# Patient Record
Sex: FEMALE | Race: OTHER
[De-identification: names, ages, dates, MRNs, and addresses within clinical notes are randomized per-mention and may not be internally consistent; named-entity substitution may affect disease eponyms.]

---

## 2018-05-08 ENCOUNTER — HOSPITAL ENCOUNTER (EMERGENCY)
Dept: HOSPITAL 94 - ER | Age: 78
Discharge: HOME | End: 2018-05-08
Payer: MEDICARE

## 2018-05-08 VITALS — BODY MASS INDEX: 49.49 KG/M2 | HEIGHT: 62 IN | WEIGHT: 268.96 LBS

## 2018-05-08 VITALS — DIASTOLIC BLOOD PRESSURE: 61 MMHG | SYSTOLIC BLOOD PRESSURE: 143 MMHG

## 2018-05-08 DIAGNOSIS — E78.00: ICD-10-CM
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
## 2018-05-08 DIAGNOSIS — K21.9: ICD-10-CM

## 2018-05-08 DIAGNOSIS — Z95.1: ICD-10-CM

## 2018-05-08 DIAGNOSIS — S50.01XA: Primary | ICD-10-CM
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
## 2018-05-08 DIAGNOSIS — Z79.899: ICD-10-CM
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
## 2018-05-08 DIAGNOSIS — I10: ICD-10-CM

## 2018-05-08 DIAGNOSIS — Z90.49: ICD-10-CM

## 2018-05-08 DIAGNOSIS — F17.200: ICD-10-CM
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
## 2018-05-08 DIAGNOSIS — G89.29: ICD-10-CM

## 2018-05-08 DIAGNOSIS — Z79.82: ICD-10-CM

## 2018-05-08 PROCEDURE — 29105 APPLICATION LONG ARM SPLINT: CPT

## 2018-05-08 PROCEDURE — 99284 EMERGENCY DEPT VISIT MOD MDM: CPT

## 2018-05-08 PROCEDURE — 73080 X-RAY EXAM OF ELBOW: CPT

## 2019-09-03 ENCOUNTER — HOSPITAL ENCOUNTER (INPATIENT)
Dept: HOSPITAL 94 - ER | Age: 79
LOS: 3 days | Discharge: HOME HEALTH | DRG: 918 | End: 2019-09-06
Attending: FAMILY MEDICINE | Admitting: SPECIALIST
Payer: MEDICARE

## 2019-09-03 VITALS — HEIGHT: 62 IN | WEIGHT: 233.69 LBS | BODY MASS INDEX: 43 KG/M2

## 2019-09-03 DIAGNOSIS — G89.29: ICD-10-CM
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
## 2019-09-03 DIAGNOSIS — G25.81: ICD-10-CM

## 2019-09-03 DIAGNOSIS — Z95.1: ICD-10-CM

## 2019-09-03 DIAGNOSIS — M54.9: ICD-10-CM

## 2019-09-03 DIAGNOSIS — K74.60: ICD-10-CM
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
## 2019-09-03 DIAGNOSIS — D64.9: ICD-10-CM

## 2019-09-03 DIAGNOSIS — N17.9: ICD-10-CM

## 2019-09-03 DIAGNOSIS — K21.9: ICD-10-CM
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
## 2019-09-03 DIAGNOSIS — B96.20: ICD-10-CM

## 2019-09-03 DIAGNOSIS — E72.20: ICD-10-CM

## 2019-09-03 DIAGNOSIS — N39.0: ICD-10-CM

## 2019-09-03 DIAGNOSIS — F32.9: ICD-10-CM

## 2019-09-03 DIAGNOSIS — I12.9: ICD-10-CM

## 2019-09-03 DIAGNOSIS — I25.10: ICD-10-CM

## 2019-09-03 DIAGNOSIS — Z90.49: ICD-10-CM

## 2019-09-03 DIAGNOSIS — N18.9: ICD-10-CM

## 2019-09-03 DIAGNOSIS — K29.70: ICD-10-CM

## 2019-09-03 DIAGNOSIS — I44.2: ICD-10-CM
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
## 2019-09-03 DIAGNOSIS — E86.0: ICD-10-CM

## 2019-09-03 DIAGNOSIS — Z79.82: ICD-10-CM

## 2019-09-03 DIAGNOSIS — E78.00: ICD-10-CM

## 2019-09-03 DIAGNOSIS — K75.81: ICD-10-CM

## 2019-09-03 DIAGNOSIS — K52.9: ICD-10-CM

## 2019-09-03 DIAGNOSIS — T42.6X3A: Primary | ICD-10-CM

## 2019-09-03 DIAGNOSIS — E78.5: ICD-10-CM

## 2019-09-03 LAB
ALBUMIN SERPL BCP-MCNC: 3.5 G/DL (ref 3.4–5)
ALBUMIN/GLOB SERPL: 0.8 {RATIO} (ref 1.1–1.5)
ALP SERPL-CCNC: 83 IU/L (ref 46–116)
ALT SERPL W P-5'-P-CCNC: 20 U/L (ref 12–78)
ANION GAP SERPL CALCULATED.3IONS-SCNC: 10 MMOL/L (ref 8–16)
AST SERPL W P-5'-P-CCNC: 23 U/L (ref 10–37)
BACTERIA URNS QL MICRO: (no result) /HPF
BASOPHILS # BLD AUTO: 0 X10'3 (ref 0–0.2)
BASOPHILS NFR BLD AUTO: 0.4 % (ref 0–1)
BILIRUB SERPL-MCNC: 0.5 MG/DL (ref 0.1–1)
BUN SERPL-MCNC: 26 MG/DL (ref 7–18)
BUN/CREAT SERPL: 11.7 (ref 6.6–38)
CALCIUM SERPL-MCNC: 9 MG/DL (ref 8.5–10.1)
CHLORIDE SERPL-SCNC: 104 MMOL/L (ref 99–107)
CLARITY UR: CLEAR
CO2 SERPL-SCNC: 25.3 MMOL/L (ref 24–32)
COLOR UR: YELLOW
CREAT SERPL-MCNC: 2.23 MG/DL (ref 0.4–0.9)
DEPRECATED SQUAMOUS URNS QL MICRO: (no result) /LPF
EOSINOPHIL # BLD AUTO: 0.1 X10'3 (ref 0–0.9)
EOSINOPHIL NFR BLD AUTO: 1.2 % (ref 0–6)
ERYTHROCYTE [DISTWIDTH] IN BLOOD BY AUTOMATED COUNT: 12.7 % (ref 11.5–14.5)
GFR SERPL CREATININE-BSD FRML MDRD: 21 ML/MIN
GLUCOSE SERPL-MCNC: 107 MG/DL (ref 70–104)
GLUCOSE UR STRIP-MCNC: NEGATIVE MG/DL
HCT VFR BLD AUTO: 36 % (ref 35–45)
HGB BLD-MCNC: 12.1 G/DL (ref 12–16)
HGB UR QL STRIP: NEGATIVE
KETONES UR STRIP-MCNC: NEGATIVE MG/DL
LEUKOCYTE ESTERASE UR QL STRIP: (no result)
LIPASE SERPL-CCNC: 243 U/L (ref 73–393)
LYMPHOCYTES # BLD AUTO: 1.8 X10'3 (ref 1.1–4.8)
LYMPHOCYTES NFR BLD AUTO: 18.5 % (ref 21–51)
MCH RBC QN AUTO: 30.9 PG (ref 27–31)
MCHC RBC AUTO-ENTMCNC: 33.5 G/DL (ref 33–36.5)
MCV RBC AUTO: 92.4 FL (ref 78–98)
MONOCYTES # BLD AUTO: 0.6 X10'3 (ref 0–0.9)
MONOCYTES NFR BLD AUTO: 6.2 % (ref 2–12)
MUCOUS THREADS URNS QL MICRO: (no result) /LPF
NEUTROPHILS # BLD AUTO: 7.2 X10'3 (ref 1.8–7.7)
NEUTROPHILS NFR BLD AUTO: 73.7 % (ref 42–75)
NITRITE UR QL STRIP: NEGATIVE
PH UR STRIP: 7 [PH] (ref 4.8–8)
PLATELET # BLD AUTO: 212 X10'3 (ref 140–440)
PMV BLD AUTO: 8.5 FL (ref 7.4–10.4)
POTASSIUM SERPL-SCNC: 4.7 MMOL/L (ref 3.5–5.1)
PROT SERPL-MCNC: 7.9 G/DL (ref 6.4–8.2)
PROT UR QL STRIP: NEGATIVE MG/DL
RBC # BLD AUTO: 3.9 X10'6 (ref 4.2–5.6)
RBC #/AREA URNS HPF: (no result) /HPF (ref 0–2)
SODIUM SERPL-SCNC: 139 MMOL/L (ref 135–145)
SP GR UR STRIP: <=1.005 (ref 1–1.03)
TROPONIN I SERPL-MCNC: < 0.04 NG/ML (ref 0–0.05)
URN COLLECT METHOD CLASS: (no result)
UROBILINOGEN UR STRIP-MCNC: 0.2 E.U/DL (ref 0.2–1)
WBC # BLD AUTO: 9.8 X10'3 (ref 4.5–11)
WBC #/AREA URNS HPF: (no result) /HPF (ref 0–4)

## 2019-09-03 PROCEDURE — 85025 COMPLETE CBC W/AUTO DIFF WBC: CPT

## 2019-09-03 PROCEDURE — 81001 URINALYSIS AUTO W/SCOPE: CPT

## 2019-09-03 PROCEDURE — 97162 PT EVAL MOD COMPLEX 30 MIN: CPT

## 2019-09-03 PROCEDURE — 70544 MR ANGIOGRAPHY HEAD W/O DYE: CPT

## 2019-09-03 PROCEDURE — 85610 PROTHROMBIN TIME: CPT

## 2019-09-03 PROCEDURE — 70551 MRI BRAIN STEM W/O DYE: CPT

## 2019-09-03 PROCEDURE — 93880 EXTRACRANIAL BILAT STUDY: CPT

## 2019-09-03 PROCEDURE — 96374 THER/PROPH/DIAG INJ IV PUSH: CPT

## 2019-09-03 PROCEDURE — 82103 ALPHA-1-ANTITRYPSIN TOTAL: CPT

## 2019-09-03 PROCEDURE — 86705 HEP B CORE ANTIBODY IGM: CPT

## 2019-09-03 PROCEDURE — 83735 ASSAY OF MAGNESIUM: CPT

## 2019-09-03 PROCEDURE — 82140 ASSAY OF AMMONIA: CPT

## 2019-09-03 PROCEDURE — 80053 COMPREHEN METABOLIC PANEL: CPT

## 2019-09-03 PROCEDURE — 97530 THERAPEUTIC ACTIVITIES: CPT

## 2019-09-03 PROCEDURE — 86706 HEP B SURFACE ANTIBODY: CPT

## 2019-09-03 PROCEDURE — 97116 GAIT TRAINING THERAPY: CPT

## 2019-09-03 PROCEDURE — 84100 ASSAY OF PHOSPHORUS: CPT

## 2019-09-03 PROCEDURE — 36415 COLL VENOUS BLD VENIPUNCTURE: CPT

## 2019-09-03 PROCEDURE — 74176 CT ABD & PELVIS W/O CONTRAST: CPT

## 2019-09-03 PROCEDURE — 87340 HEPATITIS B SURFACE AG IA: CPT

## 2019-09-03 PROCEDURE — 84484 ASSAY OF TROPONIN QUANT: CPT

## 2019-09-03 PROCEDURE — 87186 SC STD MICRODIL/AGAR DIL: CPT

## 2019-09-03 PROCEDURE — 83690 ASSAY OF LIPASE: CPT

## 2019-09-03 PROCEDURE — 86803 HEPATITIS C AB TEST: CPT

## 2019-09-03 PROCEDURE — 87088 URINE BACTERIA CULTURE: CPT

## 2019-09-03 PROCEDURE — 87081 CULTURE SCREEN ONLY: CPT

## 2019-09-03 PROCEDURE — 99285 EMERGENCY DEPT VISIT HI MDM: CPT

## 2019-09-03 PROCEDURE — 87077 CULTURE AEROBIC IDENTIFY: CPT

## 2019-09-03 PROCEDURE — 93306 TTE W/DOPPLER COMPLETE: CPT

## 2019-09-04 VITALS — SYSTOLIC BLOOD PRESSURE: 141 MMHG | DIASTOLIC BLOOD PRESSURE: 60 MMHG

## 2019-09-04 VITALS — SYSTOLIC BLOOD PRESSURE: 156 MMHG | DIASTOLIC BLOOD PRESSURE: 57 MMHG

## 2019-09-04 VITALS — DIASTOLIC BLOOD PRESSURE: 88 MMHG | SYSTOLIC BLOOD PRESSURE: 139 MMHG

## 2019-09-04 VITALS — SYSTOLIC BLOOD PRESSURE: 142 MMHG | DIASTOLIC BLOOD PRESSURE: 55 MMHG

## 2019-09-04 VITALS — SYSTOLIC BLOOD PRESSURE: 142 MMHG | DIASTOLIC BLOOD PRESSURE: 50 MMHG

## 2019-09-04 VITALS — DIASTOLIC BLOOD PRESSURE: 64 MMHG | SYSTOLIC BLOOD PRESSURE: 169 MMHG

## 2019-09-04 VITALS — SYSTOLIC BLOOD PRESSURE: 151 MMHG | DIASTOLIC BLOOD PRESSURE: 58 MMHG

## 2019-09-04 LAB
ALBUMIN SERPL BCP-MCNC: 3 G/DL (ref 3.4–5)
ALBUMIN/GLOB SERPL: 0.8 {RATIO} (ref 1.1–1.5)
ALP SERPL-CCNC: 69 IU/L (ref 46–116)
ALT SERPL W P-5'-P-CCNC: 17 U/L (ref 12–78)
ANION GAP SERPL CALCULATED.3IONS-SCNC: 6 MMOL/L (ref 8–16)
AST SERPL W P-5'-P-CCNC: 21 U/L (ref 10–37)
BASOPHILS # BLD AUTO: 0 X10'3 (ref 0–0.2)
BASOPHILS NFR BLD AUTO: 0.3 % (ref 0–1)
BILIRUB SERPL-MCNC: 0.4 MG/DL (ref 0.1–1)
BUN SERPL-MCNC: 23 MG/DL (ref 7–18)
BUN/CREAT SERPL: 10.6 (ref 6.6–38)
CALCIUM SERPL-MCNC: 8.5 MG/DL (ref 8.5–10.1)
CHLORIDE SERPL-SCNC: 109 MMOL/L (ref 99–107)
CO2 SERPL-SCNC: 27.8 MMOL/L (ref 24–32)
CREAT SERPL-MCNC: 2.18 MG/DL (ref 0.4–0.9)
EOSINOPHIL # BLD AUTO: 0.1 X10'3 (ref 0–0.9)
EOSINOPHIL NFR BLD AUTO: 1 % (ref 0–6)
ERYTHROCYTE [DISTWIDTH] IN BLOOD BY AUTOMATED COUNT: 12.9 % (ref 11.5–14.5)
GFR SERPL CREATININE-BSD FRML MDRD: 22 ML/MIN
GLUCOSE SERPL-MCNC: 99 MG/DL (ref 70–104)
HCT VFR BLD AUTO: 30.5 % (ref 35–45)
HGB BLD-MCNC: 10.3 G/DL (ref 12–16)
LYMPHOCYTES # BLD AUTO: 1.9 X10'3 (ref 1.1–4.8)
LYMPHOCYTES NFR BLD AUTO: 23.8 % (ref 21–51)
MCH RBC QN AUTO: 31.2 PG (ref 27–31)
MCHC RBC AUTO-ENTMCNC: 33.8 G/DL (ref 33–36.5)
MCV RBC AUTO: 92.3 FL (ref 78–98)
MONOCYTES # BLD AUTO: 0.6 X10'3 (ref 0–0.9)
MONOCYTES NFR BLD AUTO: 7.7 % (ref 2–12)
NEUTROPHILS # BLD AUTO: 5.4 X10'3 (ref 1.8–7.7)
NEUTROPHILS NFR BLD AUTO: 67.2 % (ref 42–75)
PLATELET # BLD AUTO: 161 X10'3 (ref 140–440)
PMV BLD AUTO: 8.4 FL (ref 7.4–10.4)
POTASSIUM SERPL-SCNC: 4.5 MMOL/L (ref 3.5–5.1)
PROT SERPL-MCNC: 6.8 G/DL (ref 6.4–8.2)
RBC # BLD AUTO: 3.3 X10'6 (ref 4.2–5.6)
SODIUM SERPL-SCNC: 143 MMOL/L (ref 135–145)
WBC # BLD AUTO: 8 X10'3 (ref 4.5–11)

## 2019-09-04 RX ADMIN — HYDROCODONE BITARTRATE AND ACETAMINOPHEN PRN TAB: 5; 325 TABLET ORAL at 08:17

## 2019-09-04 RX ADMIN — HYDROCODONE BITARTRATE AND ACETAMINOPHEN PRN TAB: 5; 325 TABLET ORAL at 15:46

## 2019-09-04 RX ADMIN — PANTOPRAZOLE SODIUM SCH MG: 40 TABLET, DELAYED RELEASE ORAL at 08:08

## 2019-09-04 RX ADMIN — HEPARIN SODIUM SCH UNIT: 5000 INJECTION, SOLUTION INTRAVENOUS; SUBCUTANEOUS at 20:52

## 2019-09-04 RX ADMIN — CEFTRIAXONE SCH MLS/HR: 1 INJECTION, SOLUTION INTRAVENOUS at 20:58

## 2019-09-04 RX ADMIN — HYDROCODONE BITARTRATE AND ACETAMINOPHEN PRN TAB: 5; 325 TABLET ORAL at 01:09

## 2019-09-04 RX ADMIN — SODIUM CHLORIDE SCH MLS/HR: 9 INJECTION INTRAMUSCULAR; INTRAVENOUS; SUBCUTANEOUS at 19:14

## 2019-09-04 RX ADMIN — HYDROCODONE BITARTRATE AND ACETAMINOPHEN PRN TAB: 10; 325 TABLET ORAL at 21:07

## 2019-09-04 RX ADMIN — HEPARIN SODIUM SCH UNIT: 5000 INJECTION, SOLUTION INTRAVENOUS; SUBCUTANEOUS at 08:10

## 2019-09-04 RX ADMIN — SODIUM CHLORIDE SCH MLS/HR: 9 INJECTION INTRAMUSCULAR; INTRAVENOUS; SUBCUTANEOUS at 09:52

## 2019-09-04 NOTE — NUR
Problems reprioritized. Patient report given, questions answered & plan of care reviewed 
with CORINE Muhammad.  Patient currently resting in bed, bed locked and low, call light in reach.  
Stable at shift change.

## 2019-09-04 NOTE — NUR
Student documentation:

I have reviewed and agree with all interventions, assessments performed and documented by 
Mandy.

## 2019-09-04 NOTE — NUR
Patient in room PCU 3017. I have received report from Jhonny Irizarry  and had the 
opportunity to ask questions and assume patient care.  Patient currently resting in bed, 
visitor at bedside, bed locked and low, call light in reach.  cardizem at 5ml/hr, HR went up 
to 150s but did not sustain, currently in 100-110s, will continue to monitor.

-------------------------------------------------------------------------------

Addendum: 09/04/19 at 0635 by Kyleigh Nolan RN

-------------------------------------------------------------------------------

charted on wrong patient

## 2019-09-04 NOTE — NUR
Problems reprioritized. Patient report given, questions answered & plan of care reviewed 
with Kyleigh POOL.

## 2019-09-04 NOTE — NUR
report given to René POOL. Pt laying comfortably in bed, call light within reach, side rails 
up x2, bed in lowest position, and bed alarm on.

## 2019-09-04 NOTE — NUR
Student documentation:

I have reviewed and agree with all interventions, assessments performed and documented by 
Mandy Minor.

## 2019-09-04 NOTE — NUR
Patient in room PCU 3017. I have received report from CORINE Weaver  and had the opportunity 
to ask questions and assume patient care.  Patient is currently sitting up on edge of bed, 
son in room, bed locked and low, call light in reach.  Patient reports needing restroom, 
will get BSC as pt had syncopal episodes at home.  patient is primarily Bulgarian speaking, 
will use translation services as necessary.  Will continue to monitor.

## 2019-09-04 NOTE — NUR
PAGER ID:  [phone] 

MESSAGE:  CORINE Arizmendi, [id number], [place], patient c/o of leg pain, asking about the "new medicine" 
the doctor told her about last night. Notes show it was requip to replace the gabapentin she 
was on at home.

## 2019-09-04 NOTE — NUR
Received report from Daniel in the ER. Pt arrived on the unit via gurney. She was able to 
transfer from the gurney to her bed with moderate assist. Pt wobbly on her feet with mild 
weakness. Her son has accompanied her and is staying at bedside.  Pt placed on tele #50, 
VSS, no signs of distress, will continue to monitor.

## 2019-09-05 VITALS — DIASTOLIC BLOOD PRESSURE: 67 MMHG | SYSTOLIC BLOOD PRESSURE: 167 MMHG

## 2019-09-05 VITALS — SYSTOLIC BLOOD PRESSURE: 181 MMHG | DIASTOLIC BLOOD PRESSURE: 61 MMHG

## 2019-09-05 VITALS — DIASTOLIC BLOOD PRESSURE: 74 MMHG | SYSTOLIC BLOOD PRESSURE: 213 MMHG

## 2019-09-05 VITALS — SYSTOLIC BLOOD PRESSURE: 149 MMHG | DIASTOLIC BLOOD PRESSURE: 55 MMHG

## 2019-09-05 VITALS — DIASTOLIC BLOOD PRESSURE: 43 MMHG | SYSTOLIC BLOOD PRESSURE: 131 MMHG

## 2019-09-05 VITALS — DIASTOLIC BLOOD PRESSURE: 48 MMHG | SYSTOLIC BLOOD PRESSURE: 156 MMHG

## 2019-09-05 VITALS — SYSTOLIC BLOOD PRESSURE: 200 MMHG | DIASTOLIC BLOOD PRESSURE: 45 MMHG

## 2019-09-05 VITALS — SYSTOLIC BLOOD PRESSURE: 135 MMHG | DIASTOLIC BLOOD PRESSURE: 48 MMHG

## 2019-09-05 VITALS — DIASTOLIC BLOOD PRESSURE: 55 MMHG | SYSTOLIC BLOOD PRESSURE: 155 MMHG

## 2019-09-05 VITALS — SYSTOLIC BLOOD PRESSURE: 147 MMHG | DIASTOLIC BLOOD PRESSURE: 54 MMHG

## 2019-09-05 LAB
ALBUMIN SERPL BCP-MCNC: 2.9 G/DL (ref 3.4–5)
ALBUMIN/GLOB SERPL: 0.8 {RATIO} (ref 1.1–1.5)
ALP SERPL-CCNC: 66 IU/L (ref 46–116)
ALT SERPL W P-5'-P-CCNC: 14 U/L (ref 12–78)
ANION GAP SERPL CALCULATED.3IONS-SCNC: 8 MMOL/L (ref 8–16)
AST SERPL W P-5'-P-CCNC: 16 U/L (ref 10–37)
BASOPHILS # BLD AUTO: 0 X10'3 (ref 0–0.2)
BASOPHILS NFR BLD AUTO: 0.3 % (ref 0–1)
BILIRUB SERPL-MCNC: 0.3 MG/DL (ref 0.1–1)
BUN SERPL-MCNC: 19 MG/DL (ref 7–18)
BUN/CREAT SERPL: 9.2 (ref 6.6–38)
CALCIUM SERPL-MCNC: 8.9 MG/DL (ref 8.5–10.1)
CHLORIDE SERPL-SCNC: 111 MMOL/L (ref 99–107)
CO2 SERPL-SCNC: 24.2 MMOL/L (ref 24–32)
CREAT SERPL-MCNC: 2.06 MG/DL (ref 0.4–0.9)
EOSINOPHIL # BLD AUTO: 0.1 X10'3 (ref 0–0.9)
EOSINOPHIL NFR BLD AUTO: 1.5 % (ref 0–6)
ERYTHROCYTE [DISTWIDTH] IN BLOOD BY AUTOMATED COUNT: 12.8 % (ref 11.5–14.5)
GFR SERPL CREATININE-BSD FRML MDRD: 23 ML/MIN
GLUCOSE SERPL-MCNC: 95 MG/DL (ref 70–104)
HCT VFR BLD AUTO: 29.9 % (ref 35–45)
HGB BLD-MCNC: 10.2 G/DL (ref 12–16)
LYMPHOCYTES # BLD AUTO: 1.9 X10'3 (ref 1.1–4.8)
LYMPHOCYTES NFR BLD AUTO: 29.6 % (ref 21–51)
MAGNESIUM SERPL-MCNC: 1.9 MG/DL (ref 1.5–2.4)
MCH RBC QN AUTO: 31.4 PG (ref 27–31)
MCHC RBC AUTO-ENTMCNC: 34.2 G/DL (ref 33–36.5)
MCV RBC AUTO: 91.8 FL (ref 78–98)
MONOCYTES # BLD AUTO: 0.5 X10'3 (ref 0–0.9)
MONOCYTES NFR BLD AUTO: 8.2 % (ref 2–12)
NEUTROPHILS # BLD AUTO: 4 X10'3 (ref 1.8–7.7)
NEUTROPHILS NFR BLD AUTO: 60.4 % (ref 42–75)
PHOSPHATE SERPL-MCNC: 3.5 MG/DL (ref 2.3–4.5)
PLATELET # BLD AUTO: 154 X10'3 (ref 140–440)
PMV BLD AUTO: 8.2 FL (ref 7.4–10.4)
POTASSIUM SERPL-SCNC: 4.4 MMOL/L (ref 3.5–5.1)
PROT SERPL-MCNC: 6.5 G/DL (ref 6.4–8.2)
RBC # BLD AUTO: 3.26 X10'6 (ref 4.2–5.6)
SODIUM SERPL-SCNC: 143 MMOL/L (ref 135–145)
WBC # BLD AUTO: 6.6 X10'3 (ref 4.5–11)

## 2019-09-05 RX ADMIN — SODIUM CHLORIDE SCH MLS/HR: 9 INJECTION INTRAMUSCULAR; INTRAVENOUS; SUBCUTANEOUS at 21:20

## 2019-09-05 RX ADMIN — CEFTRIAXONE SCH MLS/HR: 1 INJECTION, SOLUTION INTRAVENOUS at 07:41

## 2019-09-05 RX ADMIN — HYDROCODONE BITARTRATE AND ACETAMINOPHEN PRN TAB: 10; 325 TABLET ORAL at 08:14

## 2019-09-05 RX ADMIN — SODIUM CHLORIDE SCH GM: 0.9 IRRIGANT IRRIGATION at 15:11

## 2019-09-05 RX ADMIN — SODIUM CHLORIDE SCH GM: 0.9 IRRIGANT IRRIGATION at 19:22

## 2019-09-05 RX ADMIN — HEPARIN SODIUM SCH UNIT: 5000 INJECTION, SOLUTION INTRAVENOUS; SUBCUTANEOUS at 07:43

## 2019-09-05 RX ADMIN — SODIUM CHLORIDE SCH MLS/HR: 9 INJECTION INTRAMUSCULAR; INTRAVENOUS; SUBCUTANEOUS at 06:00

## 2019-09-05 RX ADMIN — HYDROCODONE BITARTRATE AND ACETAMINOPHEN PRN TAB: 5; 325 TABLET ORAL at 23:06

## 2019-09-05 RX ADMIN — HEPARIN SODIUM SCH UNIT: 5000 INJECTION, SOLUTION INTRAVENOUS; SUBCUTANEOUS at 19:24

## 2019-09-05 RX ADMIN — PANTOPRAZOLE SODIUM SCH MG: 40 TABLET, DELAYED RELEASE ORAL at 07:41

## 2019-09-05 NOTE — NUR
Patient in room PCU 3017. I have received report from CORINE Engel and had the opportunity to 
ask questions and assume patient care.

## 2019-09-05 NOTE — NUR
Problems reprioritized. Patient report given, questions answered & plan of care reviewed 
with Jamila POOL. Patient stable at transfer of care.

## 2019-09-05 NOTE — NUR
Patient in room PCU 3017. I have received report from Jb POOL  and had the opportunity to 
ask questions and assume patient care.  Patient asleep.  NS @ 100 mL/hour infusing.  All 
immediate needs met at this time.

## 2019-09-05 NOTE — NUR
PT SITTING ON TOILET. VOMITING. 9.4 SEC PAUSE. PT RIGHT BACK TO SR. DR SANTIAGO INFORMED. PT 
BACK TO BED.  V/S WNL. WILL CONTINUE TO MONITOR.

-------------------------------------------------------------------------------

Addendum: 09/06/19 at 0801 by Sindi Seivlla RN

-------------------------------------------------------------------------------

Amended: Links added.

## 2019-09-06 VITALS — SYSTOLIC BLOOD PRESSURE: 111 MMHG | DIASTOLIC BLOOD PRESSURE: 51 MMHG

## 2019-09-06 VITALS — DIASTOLIC BLOOD PRESSURE: 56 MMHG | SYSTOLIC BLOOD PRESSURE: 164 MMHG

## 2019-09-06 VITALS — DIASTOLIC BLOOD PRESSURE: 51 MMHG | SYSTOLIC BLOOD PRESSURE: 99 MMHG

## 2019-09-06 LAB
AFP SERPL-MCNC: 2 NG/ML (ref 0–8.3)
ALBUMIN SERPL BCP-MCNC: 3.2 G/DL (ref 3.4–5)
ALBUMIN/GLOB SERPL: 0.8 {RATIO} (ref 1.1–1.5)
ALP SERPL-CCNC: 73 IU/L (ref 46–116)
ALT SERPL W P-5'-P-CCNC: 17 U/L (ref 12–78)
ANION GAP SERPL CALCULATED.3IONS-SCNC: 9 MMOL/L (ref 8–16)
AST SERPL W P-5'-P-CCNC: 21 U/L (ref 10–37)
BASOPHILS # BLD AUTO: 0 X10'3 (ref 0–0.2)
BASOPHILS NFR BLD AUTO: 0.4 % (ref 0–1)
BILIRUB SERPL-MCNC: 0.5 MG/DL (ref 0.1–1)
BUN SERPL-MCNC: 16 MG/DL (ref 7–18)
BUN/CREAT SERPL: 7.1 (ref 6.6–38)
CALCIUM SERPL-MCNC: 8.6 MG/DL (ref 8.5–10.1)
CHLORIDE SERPL-SCNC: 110 MMOL/L (ref 99–107)
CO2 SERPL-SCNC: 25.2 MMOL/L (ref 24–32)
CREAT SERPL-MCNC: 2.25 MG/DL (ref 0.4–0.9)
EOSINOPHIL # BLD AUTO: 0.1 X10'3 (ref 0–0.9)
EOSINOPHIL NFR BLD AUTO: 1.9 % (ref 0–6)
ERYTHROCYTE [DISTWIDTH] IN BLOOD BY AUTOMATED COUNT: 12.5 % (ref 11.5–14.5)
GFR SERPL CREATININE-BSD FRML MDRD: 21 ML/MIN
GLUCOSE SERPL-MCNC: 100 MG/DL (ref 70–104)
HCT VFR BLD AUTO: 33.5 % (ref 35–45)
HEPATITIS C ANTIBODY: <0.1 S/CO RATIO (ref 0–0.9)
HGB BLD-MCNC: 11.2 G/DL (ref 12–16)
LYMPHOCYTES # BLD AUTO: 1.5 X10'3 (ref 1.1–4.8)
LYMPHOCYTES NFR BLD AUTO: 24.3 % (ref 21–51)
MAGNESIUM SERPL-MCNC: 1.8 MG/DL (ref 1.5–2.4)
MCH RBC QN AUTO: 31.1 PG (ref 27–31)
MCHC RBC AUTO-ENTMCNC: 33.4 G/DL (ref 33–36.5)
MCV RBC AUTO: 93 FL (ref 78–98)
MONOCYTES # BLD AUTO: 0.6 X10'3 (ref 0–0.9)
MONOCYTES NFR BLD AUTO: 9 % (ref 2–12)
NEUTROPHILS # BLD AUTO: 4.1 X10'3 (ref 1.8–7.7)
NEUTROPHILS NFR BLD AUTO: 64.4 % (ref 42–75)
PHOSPHATE SERPL-MCNC: 3.3 MG/DL (ref 2.3–4.5)
PLATELET # BLD AUTO: 158 X10'3 (ref 140–440)
PMV BLD AUTO: 8.6 FL (ref 7.4–10.4)
POTASSIUM SERPL-SCNC: 4.2 MMOL/L (ref 3.5–5.1)
PROT SERPL-MCNC: 7 G/DL (ref 6.4–8.2)
RBC # BLD AUTO: 3.6 X10'6 (ref 4.2–5.6)
SODIUM SERPL-SCNC: 144 MMOL/L (ref 135–145)
WBC # BLD AUTO: 6.3 X10'3 (ref 4.5–11)

## 2019-09-06 RX ADMIN — PANTOPRAZOLE SODIUM SCH MG: 40 TABLET, DELAYED RELEASE ORAL at 08:04

## 2019-09-06 RX ADMIN — CEFTRIAXONE SCH MLS/HR: 1 INJECTION, SOLUTION INTRAVENOUS at 08:06

## 2019-09-06 RX ADMIN — HEPARIN SODIUM SCH UNIT: 5000 INJECTION, SOLUTION INTRAVENOUS; SUBCUTANEOUS at 08:07

## 2019-09-06 RX ADMIN — HYDROCODONE BITARTRATE AND ACETAMINOPHEN PRN TAB: 10; 325 TABLET ORAL at 04:09

## 2019-09-06 RX ADMIN — SODIUM CHLORIDE SCH GM: 0.9 IRRIGANT IRRIGATION at 08:06

## 2019-09-06 RX ADMIN — SODIUM CHLORIDE SCH GM: 0.9 IRRIGANT IRRIGATION at 04:08

## 2019-09-06 RX ADMIN — SODIUM CHLORIDE SCH GM: 0.9 IRRIGANT IRRIGATION at 00:05

## 2019-09-06 NOTE — NUR
Patient discharged stable to home. All personal belongings returned to patient. Discharge 
instructions given verbally and written. Patient verbalized understanding. Prescriptions 
filled by [facility] at bedside. Peripheral IV removed, cannula intact. Tele box 
removed and returned to tele tech room. Patient wheeled out by staff via wheelchair to cab 
accompanied by son.

## 2019-09-06 NOTE — NUR
Problems reprioritized. Patient report given, questions answered & plan of care reviewed 
with CORINE España.

## 2019-09-06 NOTE — NUR
Patient in room PCU 3017. I have received report from  Jamila POOL  and had the opportunity to 
ask questions and assume patient care.

## 2019-09-07 ENCOUNTER — HOSPITAL ENCOUNTER (EMERGENCY)
Dept: HOSPITAL 94 - ER | Age: 79
LOS: 1 days | Discharge: HOME | End: 2019-09-08
Payer: MEDICARE

## 2019-09-07 VITALS — BODY MASS INDEX: 41.02 KG/M2 | HEIGHT: 63 IN | WEIGHT: 231.49 LBS

## 2019-09-07 DIAGNOSIS — R19.7: ICD-10-CM

## 2019-09-07 DIAGNOSIS — G89.29: ICD-10-CM

## 2019-09-07 DIAGNOSIS — K21.9: ICD-10-CM

## 2019-09-07 DIAGNOSIS — R11.2: ICD-10-CM

## 2019-09-07 DIAGNOSIS — Z98.61: ICD-10-CM

## 2019-09-07 DIAGNOSIS — Z79.899: ICD-10-CM

## 2019-09-07 DIAGNOSIS — Z98.890: ICD-10-CM

## 2019-09-07 DIAGNOSIS — Z90.49: ICD-10-CM

## 2019-09-07 DIAGNOSIS — F32.9: ICD-10-CM

## 2019-09-07 DIAGNOSIS — I12.9: Primary | ICD-10-CM

## 2019-09-07 DIAGNOSIS — Z95.1: ICD-10-CM

## 2019-09-07 DIAGNOSIS — Z79.82: ICD-10-CM

## 2019-09-07 DIAGNOSIS — E78.00: ICD-10-CM

## 2019-09-07 LAB
ALBUMIN SERPL BCP-MCNC: 3.8 G/DL (ref 3.4–5)
ALBUMIN/GLOB SERPL: 0.9 {RATIO} (ref 1.1–1.5)
ALP SERPL-CCNC: 84 IU/L (ref 46–116)
ALT SERPL W P-5'-P-CCNC: 21 U/L (ref 12–78)
ANION GAP SERPL CALCULATED.3IONS-SCNC: 12 MMOL/L (ref 8–16)
APTT PPP: 29 SECONDS (ref 22–32)
AST SERPL W P-5'-P-CCNC: 29 U/L (ref 10–37)
BASOPHILS # BLD AUTO: 0 X10'3 (ref 0–0.2)
BASOPHILS NFR BLD AUTO: 0.4 % (ref 0–1)
BILIRUB SERPL-MCNC: 0.8 MG/DL (ref 0.1–1)
BUN SERPL-MCNC: 18 MG/DL (ref 7–18)
BUN/CREAT SERPL: 7.8 (ref 6.6–38)
CALCIUM SERPL-MCNC: 9.5 MG/DL (ref 8.5–10.1)
CHLORIDE SERPL-SCNC: 104 MMOL/L (ref 99–107)
CLARITY UR: (no result)
CO2 SERPL-SCNC: 24.1 MMOL/L (ref 24–32)
COLOR UR: YELLOW
CREAT SERPL-MCNC: 2.3 MG/DL (ref 0.4–0.9)
EOSINOPHIL # BLD AUTO: 0 X10'3 (ref 0–0.9)
EOSINOPHIL NFR BLD AUTO: 0.5 % (ref 0–6)
ERYTHROCYTE [DISTWIDTH] IN BLOOD BY AUTOMATED COUNT: 12.7 % (ref 11.5–14.5)
GFR SERPL CREATININE-BSD FRML MDRD: 21 ML/MIN
GLUCOSE SERPL-MCNC: 118 MG/DL (ref 70–104)
GLUCOSE UR STRIP-MCNC: NEGATIVE MG/DL
HCT VFR BLD AUTO: 36.1 % (ref 35–45)
HGB BLD-MCNC: 12.4 G/DL (ref 12–16)
HGB UR QL STRIP: (no result)
KETONES UR STRIP-MCNC: 15 MG/DL
LEUKOCYTE ESTERASE UR QL STRIP: NEGATIVE
LYMPHOCYTES # BLD AUTO: 1.9 X10'3 (ref 1.1–4.8)
LYMPHOCYTES NFR BLD AUTO: 18.2 % (ref 21–51)
MCH RBC QN AUTO: 31.1 PG (ref 27–31)
MCHC RBC AUTO-ENTMCNC: 34.5 G/DL (ref 33–36.5)
MCV RBC AUTO: 90.3 FL (ref 78–98)
MONOCYTES # BLD AUTO: 0.7 X10'3 (ref 0–0.9)
MONOCYTES NFR BLD AUTO: 6.7 % (ref 2–12)
NEUTROPHILS # BLD AUTO: 7.9 X10'3 (ref 1.8–7.7)
NEUTROPHILS NFR BLD AUTO: 74.2 % (ref 42–75)
NITRITE UR QL STRIP: NEGATIVE
PH UR STRIP: 6 [PH] (ref 4.8–8)
PLATELET # BLD AUTO: 179 X10'3 (ref 140–440)
PMV BLD AUTO: 7.6 FL (ref 7.4–10.4)
POTASSIUM SERPL-SCNC: 3.7 MMOL/L (ref 3.5–5.1)
PROT SERPL-MCNC: 8.2 G/DL (ref 6.4–8.2)
PROT UR QL STRIP: 100 MG/DL
RBC # BLD AUTO: 4 X10'6 (ref 4.2–5.6)
SODIUM SERPL-SCNC: 140 MMOL/L (ref 135–145)
SP GR UR STRIP: 1.02 (ref 1–1.03)
URN COLLECT METHOD CLASS: (no result)
UROBILINOGEN UR STRIP-MCNC: 0.2 E.U/DL (ref 0.2–1)
WBC # BLD AUTO: 10.6 X10'3 (ref 4.5–11)

## 2019-09-07 PROCEDURE — 99284 EMERGENCY DEPT VISIT MOD MDM: CPT

## 2019-09-07 PROCEDURE — 36415 COLL VENOUS BLD VENIPUNCTURE: CPT

## 2019-09-07 PROCEDURE — 93005 ELECTROCARDIOGRAM TRACING: CPT

## 2019-09-07 PROCEDURE — 85610 PROTHROMBIN TIME: CPT

## 2019-09-07 PROCEDURE — 85730 THROMBOPLASTIN TIME PARTIAL: CPT

## 2019-09-07 PROCEDURE — 96374 THER/PROPH/DIAG INJ IV PUSH: CPT

## 2019-09-07 PROCEDURE — 84484 ASSAY OF TROPONIN QUANT: CPT

## 2019-09-07 PROCEDURE — 81001 URINALYSIS AUTO W/SCOPE: CPT

## 2019-09-07 PROCEDURE — 85025 COMPLETE CBC W/AUTO DIFF WBC: CPT

## 2019-09-07 PROCEDURE — 80053 COMPREHEN METABOLIC PANEL: CPT

## 2019-09-08 VITALS — SYSTOLIC BLOOD PRESSURE: 180 MMHG | DIASTOLIC BLOOD PRESSURE: 92 MMHG

## 2019-09-08 LAB
BACTERIA URNS QL MICRO: (no result) /HPF
DEPRECATED SQUAMOUS URNS QL MICRO: (no result) /LPF
HYALINE CASTS URNS QL MICRO: (no result) /LPF
MUCOUS THREADS URNS QL MICRO: (no result) /LPF
RBC #/AREA URNS HPF: (no result) /HPF (ref 0–2)
WBC #/AREA URNS HPF: (no result) /HPF (ref 0–4)

## 2022-06-03 NOTE — NUR
Patient in room PCU 3012q. I have received report from  CORINE Muhammad  and had the opportunity 
to ask questions and assume patient care. Pt sleeping at this time, no distress noted. .
